# Patient Record
Sex: FEMALE | Race: WHITE | NOT HISPANIC OR LATINO
[De-identification: names, ages, dates, MRNs, and addresses within clinical notes are randomized per-mention and may not be internally consistent; named-entity substitution may affect disease eponyms.]

---

## 2022-04-06 DIAGNOSIS — Z11.59 ENCOUNTER FOR SCREENING FOR OTHER VIRAL DISEASES: Primary | ICD-10-CM

## 2022-05-29 ENCOUNTER — HEALTH MAINTENANCE LETTER (OUTPATIENT)
Age: 62
End: 2022-05-29

## 2022-06-01 DIAGNOSIS — Z11.59 ENCOUNTER FOR SCREENING FOR OTHER VIRAL DISEASES: Primary | ICD-10-CM

## 2022-06-08 ENCOUNTER — ANESTHESIA EVENT (OUTPATIENT)
Dept: SURGERY | Facility: AMBULATORY SURGERY CENTER | Age: 62
End: 2022-06-08

## 2022-06-09 ENCOUNTER — ANESTHESIA (OUTPATIENT)
Dept: SURGERY | Facility: AMBULATORY SURGERY CENTER | Age: 62
End: 2022-06-09

## 2022-06-09 ENCOUNTER — HOSPITAL ENCOUNTER (OUTPATIENT)
Facility: AMBULATORY SURGERY CENTER | Age: 62
Discharge: HOME | End: 2022-06-09
Attending: PLASTIC SURGERY | Admitting: PLASTIC SURGERY

## 2022-06-09 VITALS
HEART RATE: 81 BPM | OXYGEN SATURATION: 98 % | WEIGHT: 135 LBS | DIASTOLIC BLOOD PRESSURE: 88 MMHG | TEMPERATURE: 96.8 F | HEIGHT: 64 IN | SYSTOLIC BLOOD PRESSURE: 149 MMHG | RESPIRATION RATE: 12 BRPM | BODY MASS INDEX: 23.05 KG/M2

## 2022-06-09 DIAGNOSIS — R52 PAIN: Primary | ICD-10-CM

## 2022-06-09 DIAGNOSIS — T88.59XA NAUSEA AFTER ANESTHESIA, INITIAL ENCOUNTER: ICD-10-CM

## 2022-06-09 DIAGNOSIS — R11.0 NAUSEA AFTER ANESTHESIA, INITIAL ENCOUNTER: ICD-10-CM

## 2022-06-09 PROCEDURE — 360N000089 HC SURGERY LEVEL COSMETIC 120 MIN

## 2022-06-09 PROCEDURE — G8916 PT W IV AB GIVEN ON TIME: HCPCS

## 2022-06-09 PROCEDURE — G8907 PT DOC NO EVENTS ON DISCHARG: HCPCS

## 2022-06-09 PROCEDURE — L8600 IMPLANT BREAST SILICONE/EQ: HCPCS

## 2022-06-09 RX ORDER — CEFADROXIL 500 MG/1
500 CAPSULE ORAL EVERY 12 HOURS SCHEDULED
Status: DISCONTINUED | OUTPATIENT
Start: 2022-06-09 | End: 2022-06-10 | Stop reason: HOSPADM

## 2022-06-09 RX ORDER — SODIUM CHLORIDE, SODIUM LACTATE, POTASSIUM CHLORIDE, CALCIUM CHLORIDE 600; 310; 30; 20 MG/100ML; MG/100ML; MG/100ML; MG/100ML
INJECTION, SOLUTION INTRAVENOUS CONTINUOUS
Status: DISCONTINUED | OUTPATIENT
Start: 2022-06-09 | End: 2022-06-10 | Stop reason: HOSPADM

## 2022-06-09 RX ORDER — HYDROXYZINE HYDROCHLORIDE 25 MG/1
25 TABLET, FILM COATED ORAL
Status: DISCONTINUED | OUTPATIENT
Start: 2022-06-09 | End: 2022-06-10 | Stop reason: HOSPADM

## 2022-06-09 RX ORDER — ONDANSETRON 2 MG/ML
INJECTION INTRAMUSCULAR; INTRAVENOUS PRN
Status: DISCONTINUED | OUTPATIENT
Start: 2022-06-09 | End: 2022-06-09

## 2022-06-09 RX ORDER — FENTANYL CITRATE 50 UG/ML
25 INJECTION, SOLUTION INTRAMUSCULAR; INTRAVENOUS
Status: DISCONTINUED | OUTPATIENT
Start: 2022-06-09 | End: 2022-06-10 | Stop reason: HOSPADM

## 2022-06-09 RX ORDER — FENTANYL CITRATE 50 UG/ML
INJECTION, SOLUTION INTRAMUSCULAR; INTRAVENOUS PRN
Status: DISCONTINUED | OUTPATIENT
Start: 2022-06-09 | End: 2022-06-09

## 2022-06-09 RX ORDER — ALBUTEROL SULFATE 0.83 MG/ML
2.5 SOLUTION RESPIRATORY (INHALATION) EVERY 4 HOURS PRN
Status: DISCONTINUED | OUTPATIENT
Start: 2022-06-09 | End: 2022-06-10 | Stop reason: HOSPADM

## 2022-06-09 RX ORDER — DIAZEPAM 10 MG/2ML
2.5 INJECTION, SOLUTION INTRAMUSCULAR; INTRAVENOUS
Status: DISCONTINUED | OUTPATIENT
Start: 2022-06-09 | End: 2022-06-10 | Stop reason: HOSPADM

## 2022-06-09 RX ORDER — ONDANSETRON 4 MG/1
4 TABLET, ORALLY DISINTEGRATING ORAL EVERY 8 HOURS PRN
Qty: 4 TABLET | Refills: 0 | Status: CANCELLED
Start: 2022-06-09

## 2022-06-09 RX ORDER — OXYCODONE HYDROCHLORIDE 5 MG/1
5 TABLET ORAL EVERY 4 HOURS PRN
Status: DISCONTINUED | OUTPATIENT
Start: 2022-06-09 | End: 2022-06-10 | Stop reason: HOSPADM

## 2022-06-09 RX ORDER — LABETALOL HYDROCHLORIDE 5 MG/ML
10 INJECTION, SOLUTION INTRAVENOUS
Status: DISCONTINUED | OUTPATIENT
Start: 2022-06-09 | End: 2022-06-10 | Stop reason: HOSPADM

## 2022-06-09 RX ORDER — LIDOCAINE HYDROCHLORIDE 20 MG/ML
INJECTION, SOLUTION INFILTRATION; PERINEURAL PRN
Status: DISCONTINUED | OUTPATIENT
Start: 2022-06-09 | End: 2022-06-09

## 2022-06-09 RX ORDER — ONDANSETRON 4 MG/1
4 TABLET, ORALLY DISINTEGRATING ORAL
Status: DISCONTINUED | OUTPATIENT
Start: 2022-06-09 | End: 2022-06-10 | Stop reason: HOSPADM

## 2022-06-09 RX ORDER — DIAZEPAM 5 MG
10 TABLET ORAL EVERY 6 HOURS PRN
Status: DISCONTINUED | OUTPATIENT
Start: 2022-06-09 | End: 2022-06-10 | Stop reason: HOSPADM

## 2022-06-09 RX ORDER — ONDANSETRON 2 MG/ML
4 INJECTION INTRAMUSCULAR; INTRAVENOUS EVERY 30 MIN PRN
Status: DISCONTINUED | OUTPATIENT
Start: 2022-06-09 | End: 2022-06-10 | Stop reason: HOSPADM

## 2022-06-09 RX ORDER — PROPOFOL 10 MG/ML
INJECTION, EMULSION INTRAVENOUS PRN
Status: DISCONTINUED | OUTPATIENT
Start: 2022-06-09 | End: 2022-06-09

## 2022-06-09 RX ORDER — DEXAMETHASONE SODIUM PHOSPHATE 10 MG/ML
INJECTION, SOLUTION INTRAMUSCULAR; INTRAVENOUS PRN
Status: DISCONTINUED | OUTPATIENT
Start: 2022-06-09 | End: 2022-06-09

## 2022-06-09 RX ORDER — OXYCODONE AND ACETAMINOPHEN 5; 325 MG/1; MG/1
1-2 TABLET ORAL EVERY 4 HOURS PRN
Status: DISCONTINUED | OUTPATIENT
Start: 2022-06-09 | End: 2022-06-10 | Stop reason: HOSPADM

## 2022-06-09 RX ORDER — ONDANSETRON 4 MG/1
4 TABLET, ORALLY DISINTEGRATING ORAL EVERY 30 MIN PRN
Status: DISCONTINUED | OUTPATIENT
Start: 2022-06-09 | End: 2022-06-10 | Stop reason: HOSPADM

## 2022-06-09 RX ORDER — OXYCODONE AND ACETAMINOPHEN 5; 325 MG/1; MG/1
2 TABLET ORAL
Status: DISCONTINUED | OUTPATIENT
Start: 2022-06-09 | End: 2022-06-10 | Stop reason: HOSPADM

## 2022-06-09 RX ORDER — FENTANYL CITRATE 50 UG/ML
25 INJECTION, SOLUTION INTRAMUSCULAR; INTRAVENOUS EVERY 5 MIN PRN
Status: DISCONTINUED | OUTPATIENT
Start: 2022-06-09 | End: 2022-06-10 | Stop reason: HOSPADM

## 2022-06-09 RX ORDER — BUPIVACAINE HYDROCHLORIDE AND EPINEPHRINE 2.5; 5 MG/ML; UG/ML
INJECTION, SOLUTION INFILTRATION; PERINEURAL PRN
Status: DISCONTINUED | OUTPATIENT
Start: 2022-06-09 | End: 2022-06-09 | Stop reason: HOSPADM

## 2022-06-09 RX ORDER — ACETAMINOPHEN 325 MG/1
975 TABLET ORAL ONCE
Status: COMPLETED | OUTPATIENT
Start: 2022-06-09 | End: 2022-06-09

## 2022-06-09 RX ORDER — LIDOCAINE 40 MG/G
CREAM TOPICAL
Status: DISCONTINUED | OUTPATIENT
Start: 2022-06-09 | End: 2022-06-10 | Stop reason: HOSPADM

## 2022-06-09 RX ORDER — PROPOFOL 10 MG/ML
INJECTION, EMULSION INTRAVENOUS CONTINUOUS PRN
Status: DISCONTINUED | OUTPATIENT
Start: 2022-06-09 | End: 2022-06-09

## 2022-06-09 RX ORDER — CEFAZOLIN SODIUM/WATER 2 G/20 ML
SYRINGE (ML) INTRAVENOUS PRN
Status: DISCONTINUED | OUTPATIENT
Start: 2022-06-09 | End: 2022-06-09

## 2022-06-09 RX ORDER — HYDROXYZINE PAMOATE 25 MG/1
25 CAPSULE ORAL EVERY 6 HOURS PRN
Qty: 30 CAPSULE | Refills: 0 | Status: CANCELLED
Start: 2022-06-09

## 2022-06-09 RX ORDER — MEPERIDINE HYDROCHLORIDE 25 MG/ML
12.5 INJECTION INTRAMUSCULAR; INTRAVENOUS; SUBCUTANEOUS
Status: DISCONTINUED | OUTPATIENT
Start: 2022-06-09 | End: 2022-06-10 | Stop reason: HOSPADM

## 2022-06-09 RX ADMIN — OXYCODONE HYDROCHLORIDE 5 MG: 5 TABLET ORAL at 15:40

## 2022-06-09 RX ADMIN — SODIUM CHLORIDE, SODIUM LACTATE, POTASSIUM CHLORIDE, CALCIUM CHLORIDE: 600; 310; 30; 20 INJECTION, SOLUTION INTRAVENOUS at 12:55

## 2022-06-09 RX ADMIN — FENTANYL CITRATE 50 MCG: 50 INJECTION, SOLUTION INTRAMUSCULAR; INTRAVENOUS at 14:05

## 2022-06-09 RX ADMIN — PROPOFOL 50 MG: 10 INJECTION, EMULSION INTRAVENOUS at 14:10

## 2022-06-09 RX ADMIN — PROPOFOL 160 MG: 10 INJECTION, EMULSION INTRAVENOUS at 13:04

## 2022-06-09 RX ADMIN — FENTANYL CITRATE 25 MCG: 50 INJECTION, SOLUTION INTRAMUSCULAR; INTRAVENOUS at 13:53

## 2022-06-09 RX ADMIN — ACETAMINOPHEN 975 MG: 325 TABLET ORAL at 12:09

## 2022-06-09 RX ADMIN — Medication 2 G: at 12:53

## 2022-06-09 RX ADMIN — DEXAMETHASONE SODIUM PHOSPHATE 4 MG: 10 INJECTION, SOLUTION INTRAMUSCULAR; INTRAVENOUS at 13:14

## 2022-06-09 RX ADMIN — Medication 10 MG: at 14:10

## 2022-06-09 RX ADMIN — PROPOFOL 150 MCG/KG/MIN: 10 INJECTION, EMULSION INTRAVENOUS at 13:04

## 2022-06-09 RX ADMIN — Medication 40 MG: at 13:04

## 2022-06-09 RX ADMIN — ONDANSETRON 4 MG: 2 INJECTION INTRAMUSCULAR; INTRAVENOUS at 14:41

## 2022-06-09 RX ADMIN — Medication 10 MG: at 13:54

## 2022-06-09 RX ADMIN — FENTANYL CITRATE 50 MCG: 50 INJECTION, SOLUTION INTRAMUSCULAR; INTRAVENOUS at 14:12

## 2022-06-09 RX ADMIN — FENTANYL CITRATE 25 MCG: 50 INJECTION, SOLUTION INTRAMUSCULAR; INTRAVENOUS at 13:24

## 2022-06-09 RX ADMIN — LIDOCAINE HYDROCHLORIDE 80 MG: 20 INJECTION, SOLUTION INFILTRATION; PERINEURAL at 13:04

## 2022-06-09 RX ADMIN — FENTANYL CITRATE 50 MCG: 50 INJECTION, SOLUTION INTRAMUSCULAR; INTRAVENOUS at 13:04

## 2022-06-09 NOTE — ANESTHESIA PROCEDURE NOTES
Airway       Patient location during procedure: OR       Procedure Start/Stop Times: 6/9/2022 1:06 PM  Staff -        Performed By: CRNA and anesthesiologist  Consent for Airway        Urgency: elective  Indications and Patient Condition       Indications for airway management: serg-procedural       Induction type:intravenous       Mask difficulty assessment: 1 - vent by mask    Final Airway Details       Final airway type: endotracheal airway       Successful airway: ETT - single  Endotracheal Airway Details        ETT size (mm): 7.0       Cuffed: yes       Successful intubation technique: direct laryngoscopy       DL Blade Type: Weber 2       Grade View of Cords: 1       Adjucts: stylet       Position: Right       Measured from: lips       Secured at (cm): 20       Bite block used: Oral Airway    Post intubation assessment        Placement verified by: capnometry, equal breath sounds and chest rise        Number of attempts at approach: 1       Number of other approaches attempted: 0       Secured with: plastic tape       Ease of procedure: easy       Dentition: Intact and Unchanged    Medication(s) Administered   Medication Administration Time: 6/9/2022 1:06 PM

## 2022-06-09 NOTE — ANESTHESIA CARE TRANSFER NOTE
Patient: Maggie Gómez    Procedure: Procedure(s):  Remove and replace breast implants  Possible cresent lift       Diagnosis: Encounter for cosmetic surgery [Z41.1]  Diagnosis Additional Information: No value filed.    Anesthesia Type:   General     Note:    Oropharynx: oropharynx clear of all foreign objects and spontaneously breathing  Level of Consciousness: drowsy  Oxygen Supplementation: face mask    Independent Airway: airway patency satisfactory and stable  Dentition: dentition unchanged  Vital Signs Stable: post-procedure vital signs reviewed and stable  Report to RN Given: handoff report given  Patient transferred to: PACU    Handoff Report: Identifed the Patient, Identified the Reponsible Provider, Reviewed the pertinent medical history, Discussed the surgical course, Reviewed Intra-OP anesthesia mangement and issues during anesthesia, Set expectations for post-procedure period and Allowed opportunity for questions and acknowledgement of understanding      Vitals:  Vitals Value Taken Time   BP     Temp     Pulse     Resp     SpO2 100 % 06/09/22 1453   Vitals shown include unvalidated device data.    Electronically Signed By: TATIANA Lennon CRNA  June 9, 2022  2:54 PM

## 2022-06-09 NOTE — PROVIDER NOTIFICATION
Upon arrival to PACU, ECG strip looks abnormal and different from previous ECG seen in chart from 8/2021. Denies chest pain or any cardiac symptoms. Notified Dr. Dia who ordered a 12 lead ECG; he reviewed this and has no concerns at this time. See note from Dr. Dia.

## 2022-06-09 NOTE — ANESTHESIA POSTPROCEDURE EVALUATION
Patient: Maggie Gómez    Procedure: Procedure(s):  Remove and replace breast implants  Possible cresent lift       Anesthesia Type:  General    Note:  Disposition: Outpatient   Postop Pain Control: Uneventful            Sign Out: Well controlled pain   PONV: No   Neuro/Psych: Uneventful            Sign Out: Acceptable/Baseline neuro status   Airway/Respiratory: Uneventful            Sign Out: Acceptable/Baseline resp. status   CV/Hemodynamics: Uneventful            Sign Out: Acceptable CV status   Other NRE: NONE   DID A NON-ROUTINE EVENT OCCUR? No    Event details/Postop Comments:  Did do an EKG because strip looked slightly abnormal.  Some Twave flattening but patient denied any symptoms of SOB, CP etc.  Denies any history of SOB, BANKS, orthopnea, CP with activity.  Looked more normal on monitor prior to discharge.           Last vitals:  Vitals Value Taken Time   /84 06/09/22 1530   Temp 36.2  C (97.1  F) 06/09/22 1453   Pulse 74 06/09/22 1532   Resp 10 06/09/22 1532   SpO2 97 % 06/09/22 1532   Vitals shown include unvalidated device data.    Electronically Signed By: Fred Dia MD  June 9, 2022  3:59 PM

## 2022-06-09 NOTE — ANESTHESIA PREPROCEDURE EVALUATION
Anesthesia Pre-Procedure Evaluation    Patient: Maggie Gómez   MRN: [id number] : 1960        Procedure : Procedure(s):  Remove and replace breast implants  Possible cresent lift          Past Medical History:   Diagnosis Date     Hypertension       Past Surgical History:   Procedure Laterality Date     APPENDECTOMY        No Known Allergies   Social History     Tobacco Use     Smoking status: Never Smoker     Smokeless tobacco: Never Used   Substance Use Topics     Alcohol use: Yes     Comment: Few beers /month      Wt Readings from Last 1 Encounters:   22 61.2 kg (135 lb)        Anesthesia Evaluation            ROS/MED HX  ENT/Pulmonary:  - neg pulmonary ROS     Neurologic:  - neg neurologic ROS     Cardiovascular:  - neg cardiovascular ROS   (+) hypertension-----    METS/Exercise Tolerance:     Hematologic:  - neg hematologic  ROS     Musculoskeletal:  - neg musculoskeletal ROS     GI/Hepatic:  - neg GI/hepatic ROS     Renal/Genitourinary:  - neg Renal ROS     Endo:  - neg endo ROS     Psychiatric/Substance Use:  - neg psychiatric ROS     Infectious Disease:  - neg infectious disease ROS     Malignancy:  - neg malignancy ROS     Other:  - neg other ROS          Physical Exam    Airway  airway exam normal      Mallampati: II   TM distance: > 3 FB   Neck ROM: full   Mouth opening: > 3 cm    Respiratory Devices and Support         Dental  no notable dental history         Cardiovascular          Rhythm and rate: regular and normal     Pulmonary   pulmonary exam normal        breath sounds clear to auscultation           OUTSIDE LABS:  CBC: No results found for: WBC, HGB, HCT, PLT  BMP: No results found for: NA, POTASSIUM, CHLORIDE, CO2, BUN, CR, GLC  COAGS: No results found for: PTT, INR, FIBR  POC: No results found for: BGM, HCG, HCGS  HEPATIC: No results found for: ALBUMIN, PROTTOTAL, ALT, AST, GGT, ALKPHOS, BILITOTAL, BILIDIRECT, KELSI  OTHER: No results found for: PH, LACT, A1C, ELIZA, PHOS, MAG,  LIPASE, AMYLASE, TSH, T4, T3, CRP, SED    Anesthesia Plan    ASA Status:  1   NPO Status:  NPO Appropriate    Anesthesia Type: General.     - Airway: ETT   Induction: Intravenous.   Maintenance: Balanced.        Consents    Anesthesia Plan(s) and associated risks, benefits, and realistic alternatives discussed. Questions answered and patient/representative(s) expressed understanding.    - Discussed:     - Discussed with:  Patient      - Extended Intubation/Ventilatory Support Discussed: No.      - Patient is DNR/DNI Status: No    Use of blood products discussed: No .     Postoperative Care    Pain management: IV analgesics, Oral pain medications, Multi-modal analgesia.   PONV prophylaxis: Ondansetron (or other 5HT-3), Background Propofol Infusion     Comments:                Fred Dia MD

## 2022-06-09 NOTE — DISCHARGE INSTRUCTIONS
You had 975 mg of Tylenol at 12:00 PM. You may repeat this after 6:00 PM. Maximum amount of Tylenol/Acetaminophen in a 24 hour period is 4,000 mg.    [facility]   Adult Discharge Orders & Instructions     For 24 hours after surgery    Get plenty of rest.  A responsible adult must stay with you for at least 24 hours after you leave the hospital.   Do not drive or use heavy equipment.  If you have weakness or tingling, don't drive or use heavy equipment until this feeling goes away.  Do not drink alcohol.  Avoid strenuous or risky activities.  Ask for help when climbing stairs.   You may feel lightheaded.  IF so, sit for a few minutes before standing.  Have someone help you get up.   If you have nausea (feel sick to your stomach): Drink only clear liquids such as apple juice, ginger ale, broth or 7-Up.  Rest may also help.  Be sure to drink enough fluids.  Move to a regular diet as you feel able.  You may have a slight fever. Call the doctor if your fever is over 100 F (37.7 C) (taken under the tongue) or lasts longer than 24 hours.  You may have a dry mouth, a sore throat, muscle aches or trouble sleeping.  These should go away after 24 hours.  Do not make important or legal decisions.     Call your doctor for any of the followin.  Signs of infection (fever, growing tenderness at the surgery site, a large amount of drainage or bleeding, severe pain, foul-smelling drainage, redness, swelling).    2. It has been over 8 to 10 hours since surgery and you are still not able to urinate (pass water).    3.  Headache for over 24 hours.    4.  Numbness, tingling or weakness the day after surgery (if you had spinal anesthesia).                  5. Signs of Covid-19 infection (temperature over 100 degrees, shortness of breath, cough, loss of taste/smell, generalized body aches, persistent headache, chills, sore throat, nausea/vomiting/diarrhea).

## 2022-06-09 NOTE — BRIEF OP NOTE
[facility] Brief Operative Note    Pre-operative diagnosis: Deflation of Right breast implant   Post-operative diagnosis same   Procedure: Procedure(s):  Remove and replace breast implants  Possible cresent lift   Surgeon(s): Surgeon(s) and Role:     * Gamaliel Ritchie MD - Primary   Estimated blood loss: 5ml   Specimens: none   Findings: Right deflated breast implant   Complications: none  Condition: extubated and stable to PAR    Gamaliel Ritchie MD

## 2022-06-17 NOTE — OP NOTE
Procedure Date: 06/09/2022    PREOPERATIVE DIAGNOSES:    1.  Deflation of right saline breast implant.  2.  History of firmness of right breast implant dating to 2005.    POSTOPERATIVE DIAGNOSES:    1.  Deflated right breast implant.  2.  Grade 3 capsular contracture, right breast.  History of breast asymmetry, right breast smaller than left.  3.  Completely intact left saline breast implant with normal capsule.      PROCEDURE:    1.  Removal of bilateral saline breast implants.  2.  Right breast complete capsulectomy.  3.  Replacement of saline breast implants with Sientra HSC smooth, round, high profile cohesive gel breast implants.    IMPLANTS:    1.  Left breast implant, Sientra 300 mL HSC smooth, round, high profile cohesive gel breast implant, reference number [id number], serial #[id number].  2.  Right breast implant, Sientra 385 mL smooth, round, high profile cohesive gel breast implant, reference number [id number], serial #[id number].    INDICATIONS FOR PROCEDURE:  The patient is a very pleasant and attractive 62-year-old female who first underwent breast augmentation by Gamaliel Fenton MD from [facility] on 02/25/2005.  The patient noted asymmetry at her first surgery. McGhan style 468, 300 mL implants were used with a 330 mL fill volume on the right and a 300 mL fill volume on the left.  the patient returned to the office with firmness of her right breast implant in the calendar year 2005.  The patient was taken to the operating room on 05/15/2006 for exchange of right breast implant.  A 380-400 cc style 48 textured anatomic implant was used with a slightly overfill to 400 mL.    The patient first presented to our office on 04/05/2022 with a deflated right breast implant.  She noted that the right breast implant had always been firmer than the left, dating back to 2005 and 2006.  I told the patient it is possible that she has a condition known as capsular contracture.  She was told by [facility] at implant exchange there was evidence of capsular contracture.  The patient was taken to the operating room now for removal and replacement of her left breast implants.  The patient has elected to use a Sientra smooth, round, high profile cohesive gel breast implant.    The patient understands that breast implants are not considered a lifetime medical device and she will likely have to have them replaced within her lifetime.  The current generation of Sientra HSC gel breast implants do carry a lifetime warranty.  The patient understands there is a phenomenon of silent rupture where the implant may fail and neither the surgeon or patient are aware of this.  I recommend MRI or ultrasound surveillance for silent rupture at 3, 5 and 7 years.    The patient understands there is a phenomenon of capsular contracture where the body may form an aggressive scar capsule around the breast implant.  I told her I believe that there is a high chance that she has capsular contracture of her right breast based on her history.  The rate of capsular contracture with regard to her new breast implants in my practice is between 2% and 3% when the implant is placed in a submuscular position through an inframammary fold incision, as will be the case for this patient.  The patient was told there are 4 maneuvers I will utilize to diminish her chance of capsule contracture.  The first is submuscular breast implant placement.  Her breast implants are already in a submuscular position.  Submuscular breast implant placement confers a 3 or 4 fold decrease in rate of capsular contracture compared with subglandular breast augmentation.  Subglandular breast augmentation carries a rate of capsular contracture in the 12%-16% range, and this can be lowered to the 3%-4% range if placing the implant in a submuscular position.  Use of an inframammary fold incision for placement of breast implant has been shown to confer a 10-fold  reduction of capsular contracture compared with the use of a periareolar incision. The patient's initial surgery was a periareolar incision.  Studied by Tawanda Whiting MD, from [facility] showed the rate of capsular contracture through an inframammary fold incision was 0.59%, whereas his capsular contracture rate was 9.5% when a periareolar incision was used.  It is my practice habit to always place at 3M Tegaderm dressing over the nipple areolar complex after prepping and draping is complete to keep the nipple areolar complex completely out of the sterile field.  I did not believe in the transjugular approach due to its high rate of capsular contracture.    Use of breast pocket irrigation consisting of Ancef, gentamicin and bacitracin have been shown to decrease the rate of capsular contracture in clinical studies performed by Horacio Thornton MD, from [facility].  The last maneuver I use is a no-touch technique where I use a Arevalo funnel for implant placement.  the use of a Arevalo funnel should theoretically decrease the chance of developing a biofilm and therefore capsular contracture.    The patient was told that special mammographic views known as Baron views are used to best visualize the breasts following augmentation mammoplasty.    The patient was told there is a condition known as breast implant-associated lymphoma.  Approximately 500-600 cases have been reported worldwide measured to date and all have been shown to occur in patients where the surgeon has chosen to use a textured implant.  Dr. Fneton DID use a textured implant, and it should be noted.  In my practice dating to 1998, I have used only smooth implants in my practice.  No cases of breast implant-associated lymphoma have occurred with the use of smooth breast implants.    The patient understands that her incision will be an inframammary fold.  She understands the risks of the operation include first and  foremost, the chance of capsule contracture, followed by implant malposition, asymmetry, hypertrophic scarring, possible dissatisfaction with cosmetic outcome.  Complications with bleeding and infection are extremely rare in my practice.  I have had one postoperative bleed or hematoma and 1 single infection requiring implant removal dating to 1994.  The patient was given a chance to ask questions and all were answered.  the patient provides informed consent for the procedure after preoperative evaluation in our office and again the morning of surgery at [facility] [place].    DESCRIPTION OF PROCEDURE AND FINDINGS:  In the preoperative holding area, her inframammary fold was marked and consent was obtained.  The patient was taken to the operating room and placed in supine position on the operating table.  Successful general endotracheal anesthetic was then induced using a laryngeal mask airway.  The patient received 2 grams of Ancef and 10 mg of Decadron intravenous prior to commencing the surgery.  Sequential compression stockings were placed on lower extremities to diminish the chance of deep vein thrombosis and pulmonary embolus.  The patient's chest was then prepped and draped in routine sterile fashion.  The nipple areolar complexes were covered with 3M Tegaderm dressings.  A timeout was called at 1:17 p.m. and procedure started at 1:18 p.m.  A 3.5 cm incision was made in the right inframammary fold.  Through this incision, dissection was carried down to the breast pocket, which was in a submuscular position.  The deflated implant was removed.  The patient has not opted to use Allergan implants, despite the fact that there is a lifetime warranty on her saline breast implant. On entering the capsule, it could be seen it was quite thick.  It appeared to be consistent with capsular contracture.  I placed a sizer and tried to inflate it.  The capsule prohibited me from expanding the sizer to fill  the breast.  In fact, it was squeezed in the mid portion so that it has a more or less figure eight appearance.  The diagnosis of capsular contracture was made.  This was very consistent with her history of firmness of the right breast, dating to 2005.  A complete capsulectomy was performed, removing the capsule in its entirety.  I left a small rim of capsule laterally so as to avoid having dissection pocket extend too far laterally towards her axilla, which could result in implant malposition. I then injected the right breast pocket with 0.25% Marcaine with Kenalog for postoperative analgesia and for the anti-inflammatory properties of the steroids.  Right breast pocket was then irrigated with solution consisting of 1 gram of Ancef, 80 mg of gentamicin and bacitracin in normal saline.  This was done in accordance with studies by Horacio Thornton MD, from the [facility].  It is a clinically proven means of diminishing the chance of capsule contracture.  A sizer was placed and gave a very nice shape to the breast.  I had different sizers available to me on this day, both a 300 mL high profile sizer and a 385 mL high profile sizer based on her previous fill volumes by Gamaliel Fenton MD from [facility].  A saline-soaked sponge was then placed in the right-sided breast pocket.  Attention was then directed to the left side.  Again, a 4 cm incision was made in the right inframammary fold and through this incision, dissection was carried down to the breast pocket, which was entered in a submuscular plane.  The breast capsule on the left side was smooth, glistening and pliable and appeared to be completely normal.  The 385 mL sizer was inflated and gave nice shape to her breast.  I then turned my attention to the right side and inflated this sizer to 300 mL. This gave my best approximation of symmetry.    Attention was directed back to the left breast where I injected the pocket with 0.25%  Marcaine with 1:200,000 epinephrine and Kenalog for postoperative analgesia and for the anti-inflammatory properties of the steroids.  The left breast pocket was then irrigated with solution consisting of 1 gram of Ancef, 80 mg of gentamicin, bacitracin and normal saline.  This was done in accordance with studies by Horacio Thornton MD, from [facility] as a clinically proven means of diminishing chance of capsule contracture.    At this point, all operative personnel changed their gloves.  Attention was directed to the back table where a Arevalo funnel was opened and trimmed to the appropriate size for a 300 mL implant. A Sientra 300 mL smooth round high profile cohesive gel breast implant was opened on the back table.  It was soaked in the Ancef, gentamicin, and bacitracin solution.  I then lubricated the Arevalo funnel with the Ancef, gentamicin, and bacitracin solution.  The implant was then transferred from its sterile container to the Arevalo funnel.  The Sientra 300 mL HSC smooth, round, high profile cohesive gel breast implant was inserted using the Arevalo funnel and a no-touch technique.  Closure consisted of 3-0 Vicryl sutures in the muscular layer.  Deep subcutaneous sutures were placed using 4-0 PDS suture in buried interrupted fashion.  Deep dermal sutures were placed using 4-0 Monocryl suture in buried interrupted fashion.  The skin was then run using 4-0 Prolene suture in running and buried continuous intracuticular fashion.  6-0 Prolene sutures were placed at each end of the incision for exact coaptation of the skin.    Attention was then directed to the left side where identical implant insertion was performed.  Once again, all operative personnel changed their gloves.  The Arevalo funnel was trimmed to the appropriate size for a 385 mL implant.  A 385 mL implant was chosen.  The reference numbers and serial numbers of these implants can be found at the beginning of this operative  report.  The implant was soaked in the antibiotic solution consisting of Ancef, gentamicin, and bacitracin.  The Arevalo funnel was lubricated with the Ancef, gentamicin, and bacitracin solution.  The Sientra implant was transferred from a sterile container to the Arevalo funnel and the implant was inserted into the left breast pocket utilizing a no-touch technique.  Closure was identical from left to right.  Dressing consisted of Mastisol, 3-1/2-inch Steri-Strips over the inframammary fold incisions, followed by a surgical bra.    ESTIMATED BLOOD LOSS:  None on the left side, approximately 10 mL on the right side where the complete capsulectomy was performed.  Procedure end time 2:45 p.m.    COMPLICATIONS:  None.    DISPOSITION:  Stable to postanesthesia recovery.    Gamaliel Ritchie MD        D: 2022   T: 2022   MT: [id number]    Name:     JO CISNEROSDivya  MRN:      [id number]        Account:        [id number]   :      1960           Procedure Date: 2022     Document: [id number]

## 2022-10-03 ENCOUNTER — HEALTH MAINTENANCE LETTER (OUTPATIENT)
Age: 62
End: 2022-10-03

## 2023-06-04 ENCOUNTER — HEALTH MAINTENANCE LETTER (OUTPATIENT)
Age: 63
End: 2023-06-04

## 2024-07-28 ENCOUNTER — HEALTH MAINTENANCE LETTER (OUTPATIENT)
Age: 64
End: 2024-07-28

## (undated) DEVICE — DRAPE BREAST/CHEST 29420

## (undated) DEVICE — ADH LIQUID MASTISOL TOPICAL VIAL 2-3ML 0523-48

## (undated) DEVICE — SOL WATER IRRIG 1000ML BOTTLE 07139-09

## (undated) DEVICE — SYR BULB IRRIG DOVER 60 ML LATEX FREE 67000

## (undated) DEVICE — SPECIMEN CONTAINER 4OZ

## (undated) DEVICE — SUCTION CANISTER MEDIVAC LINER 1500ML W/LID 65651-515

## (undated) DEVICE — PREP CHLORAPREP 26ML TINTED ORANGE  260815

## (undated) DEVICE — STPL SKIN 35W 054887

## (undated) DEVICE — SUCTION TIP YANKAUER W/O VENT K86

## (undated) DEVICE — GLOVE PROTEXIS W/NEU-THERA 7.5  2D73TE75

## (undated) DEVICE — GLOVE PROTEXIS W/NEU-THERA 6.5  2D73TE65

## (undated) DEVICE — ESU ELEC BLADE 2.75" COATED/INSULATED E1455

## (undated) DEVICE — CATH TRAY FOLEY SURESTEP 16FR W/URINE MTR STATLK LF A303416A

## (undated) DEVICE — GLOVE PROTEXIS BLUE W/NEU-THERA 7.0  2D73EB70

## (undated) DEVICE — DECANTER BAG 2002S

## (undated) DEVICE — DECANTER TRANSFER DEVICE 2008S

## (undated) DEVICE — SYR 50ML LL W/O NDL 309653

## (undated) DEVICE — NDL 19GA 1.5"

## (undated) DEVICE — DRSG TEGADERM 2 3/8X2 3/4" 1624W

## (undated) DEVICE — NDL SPINAL 25GA 3.5" QUINCKE 405180

## (undated) DEVICE — PACK MINOR SBA15MIFSE

## (undated) DEVICE — DRSG STERI STRIP 1/2X4" R1547

## (undated) DEVICE — BLADE KNIFE SURG 15 371115

## (undated) DEVICE — SOL NACL 0.9% INJ 100ML BAG 2B1307

## (undated) DEVICE — DRSG ADAPTIC 3X8" 6113

## (undated) RX ORDER — FENTANYL CITRATE 50 UG/ML
INJECTION, SOLUTION INTRAMUSCULAR; INTRAVENOUS
Status: DISPENSED
Start: 2022-06-09

## (undated) RX ORDER — OXYCODONE HYDROCHLORIDE 5 MG/1
TABLET ORAL
Status: DISPENSED
Start: 2022-06-09

## (undated) RX ORDER — ACETAMINOPHEN 325 MG/1
TABLET ORAL
Status: DISPENSED
Start: 2022-06-09

## (undated) RX ORDER — ONDANSETRON 2 MG/ML
INJECTION INTRAMUSCULAR; INTRAVENOUS
Status: DISPENSED
Start: 2022-06-09

## (undated) RX ORDER — DEXAMETHASONE SODIUM PHOSPHATE 4 MG/ML
INJECTION, SOLUTION INTRA-ARTICULAR; INTRALESIONAL; INTRAMUSCULAR; INTRAVENOUS; SOFT TISSUE
Status: DISPENSED
Start: 2022-06-09

## (undated) RX ORDER — CEFAZOLIN SODIUM 1 G/3ML
INJECTION, POWDER, FOR SOLUTION INTRAMUSCULAR; INTRAVENOUS
Status: DISPENSED
Start: 2022-06-09

## (undated) RX ORDER — BUPIVACAINE HYDROCHLORIDE 2.5 MG/ML
INJECTION, SOLUTION EPIDURAL; INFILTRATION; INTRACAUDAL
Status: DISPENSED
Start: 2022-06-09

## (undated) RX ORDER — TRIAMCINOLONE ACETONIDE 40 MG/ML
INJECTION, SUSPENSION INTRA-ARTICULAR; INTRAMUSCULAR
Status: DISPENSED
Start: 2022-06-09

## (undated) RX ORDER — GENTAMICIN 40 MG/ML
INJECTION, SOLUTION INTRAMUSCULAR; INTRAVENOUS
Status: DISPENSED
Start: 2022-06-09